# Patient Record
(demographics unavailable — no encounter records)

---

## 2025-02-19 NOTE — ADDENDUM
[FreeTextEntry1] : I, Nestor Chiquita, acted solely as a scribe for Dr. Tigre Madsen M.D. on this date 02/19/2025.   All medical record entries made by the Scribe were at my, Dr. Tigre Madsen M.D., direction and personally dictated by me on 02/19/2025. I have reviewed the chart and agree that the record accurately reflects my personal performance of the history, physical exam, assessment and plan. I have also personally directed, reviewed, and agreed with the chart.

## 2025-02-19 NOTE — ASSESSMENT
[FreeTextEntry1] : Asthma exacerbation.  Probably triggered by virus.  Interfering with CPAP usage. Pt should start taking Prednisone 10mg for 4 days for his increased respiratory symptoms. Pt should also begin Advair Maintenace inhaler 2 puffs twice a day for his asthma. Follow up appointment in 1 month

## 2025-02-19 NOTE — HISTORY OF PRESENT ILLNESS
[TextBox_4] : 51 year old male presents for follow up office visit regarding asthma, cough and SANJUANITA. Past 1 week Pt notes increased cough and dyspnea. Pt denies acid reflux or heartburn symptoms.  Reports no current sleep symptoms. Reports no symptoms of daytime sleepiness. Getting supplies regularly. Pt notes he is not currently on treatment with maintence inhalers at this time for his asthma.   Notes because of the cough he is unable to sustain his CPAP use no abdominal pain, no bloating, no constipation, no diarrhea, no nausea and no vomiting.

## 2025-02-19 NOTE — REASON FOR VISIT
[Follow-Up] : a follow-up visit [Sleep Apnea] : sleep apnea [Cough] : cough [Acute] : an acute visit

## 2025-02-19 NOTE — PROCEDURE
[FreeTextEntry1] : Chest Xray performed during office visit today -No acute findings Spirometry performed during office visit today-Demonstrates interval decline in lung function with positive bronchodilator response --------------------------------------------------------------- Usage days 22/30 days (73%) >= 4 hours 10 days (33%) < 4 hours 12 days (40%) Usage hours 82 hours 33 minutes Average usage (total days) 2 hours 45 minutes Average usage (days used) 3 hours 45 minutes Median usage (days used) 3 hours 48 minutes Total used hours (value since last reset - 02/18/2025) 867 hours AirSense 10 AutoSet Serial number [id number] Mode AutoSet Min Pressure 11 cmH2O Max Pressure 15 cmH2O EPR Fulltime EPR level 3 Response Standard Therapy Pressure - cmH2O Median: 11.8 95th percentile: 13.4 Maximum: 13.9 Leaks - L/min Median: 8.7 95th percentile: 27.2 Maximum: 35.2 Events per hour AI: 5.9 HI: 0.4 AHI: 6.3 Apnea Index Central: 1.6 Obstructive: 3.8 Unknown: 0.5 RERA Index 0.1

## 2025-02-19 NOTE — PROCEDURE
[FreeTextEntry1] : Chest Xray performed during office visit today -No acute findings Spirometry performed during office visit today-Demonstrates interval decline in lung function with positive bronchodilator response --------------------------------------------------------------- Usage days 22/30 days (73%) >= 4 hours 10 days (33%) < 4 hours 12 days (40%) Usage hours 82 hours 33 minutes Average usage (total days) 2 hours 45 minutes Average usage (days used) 3 hours 45 minutes Median usage (days used) 3 hours 48 minutes Total used hours (value since last reset - 02/18/2025) 867 hours AirSense 10 AutoSet Serial number [id number] Mode AutoSet Min Pressure 11 cmH2O Max Pressure 15 cmH2O EPR Fulltime EPR level 3 Response Standard Therapy Pressure - cmH2O Median: 11.8 95th percentile: 13.4 Maximum: 13.9 Leaks - L/min Median: 8.7 95th percentile: 27.2 Maximum: 35.2 Events per hour AI: 5.9 HI: 0.4 AHI: 6.3 Apnea Index Central: 1.6 Obstructive: 3.8 Unknown: 0.5 RERA Index 0.1

## 2025-02-19 NOTE — HISTORY OF PRESENT ILLNESS
[TextBox_4] : 51 year old male presents for follow up office visit regarding asthma, cough and SANJUANITA. Past 1 week Pt notes increased cough and dyspnea. Pt denies acid reflux or heartburn symptoms.  Reports no current sleep symptoms. Reports no symptoms of daytime sleepiness. Getting supplies regularly. Pt notes he is not currently on treatment with maintence inhalers at this time for his asthma.   Notes because of the cough he is unable to sustain his CPAP use

## 2025-03-03 NOTE — REASON FOR VISIT
[Home] : at home, [unfilled] , at the time of the visit. [Medical Office: ([facility])___] : at the medical office located in  [Technical] : patient unable to effectively utilize tele-video due to technical issues.

## 2025-03-03 NOTE — REASON FOR VISIT
[Home] : at home, [unfilled] , at the time of the visit. [Medical Office: ([facility])___] : at the medical office located in  [Technical] : patient unable to effectively utilize tele-video due to technical issues.

## 2025-03-04 NOTE — PROCEDURE
[FreeTextEntry1] : Spirometry demonstrates interval improvement but not to baseline Chest x-ray clear

## 2025-03-04 NOTE — ASSESSMENT
[FreeTextEntry1] : Spirometry shows interval improvement and Chest xray shows no sign of pneumonia.  Will give additional course of steroids From sleep apnea perspective patient would like to try fullface mask. [place] under the nose full face mask was given in replacement for nasal mask, per request of pt.  f/u in 1 month.

## 2025-03-04 NOTE — ASSESSMENT
[FreeTextEntry1] : Spirometry shows interval improvement and Chest xray shows no sign of pneumonia.  Will give additional course of steroids From sleep apnea perspective patient would like to try fullface mask. [place] under the nose full face mask was given in replacement for nasal mask, per request of pt.  f/u in 1 month.

## 2025-03-04 NOTE — PROCEDURE
Home Oxygen Evaluation completed per MD order.  Goal Sats per MD order are 88%  Pre- 98% at rest on RA, HR was 66      value Pulse ox Pulse rate distance   1 88% RA 82 80 feet   2 87% RA, 1 L NC Applied 84 160 feet   3 92% 1 L NC 78 180 feet   4      5      6           Total distance covered:180 feet     Comment: patient unable to walk further due to back issues.  MD notified. At rest post test patient was 97% on RA, HR was 70.       [FreeTextEntry1] : Spirometry demonstrates interval improvement but not to baseline Chest x-ray clear

## 2025-03-04 NOTE — HISTORY OF PRESENT ILLNESS
[TextBox_4] : Telehealth visit done yesterday for sleep apnea follow-up.  He has been unable to use his CPAP because of persistent cough and congestion after illness a few weeks ago.  He still reports cough and congestion as well as wheezing.

## 2025-04-24 NOTE — ASSESSMENT
[FreeTextEntry1] : He appears to have 2 separate unrelated issues.  He is not compliant adequately with CPAP.  He states it is not because of the breathing issue.  He simply needs to be more compliant.  The second issue is the asthma and wheezing symptoms is spirometry is normal and his lungs are clear.  I suggested for now that he take a dose of albuterol before bedtime and see if this relieves the problem otherwise I will suggest a workup for reflux

## 2025-04-24 NOTE — ADDENDUM
[FreeTextEntry1] : Recorded by Maya Branch acting as a scribe for Dr. Tigre Madsen M.D, on 04/24/2025  All medical record entries made by the Scribe were at my, Dr. Tigre Madsen M.D., direction and personally dictated by me on 04/24/2025. I have reviewed the chart and agree that the record accurately reflects my personal performance of the history, physical exam, assessment and plan. I have also personally directed, reviewed, and agreed with the chart.

## 2025-04-24 NOTE — PROCEDURE
[FreeTextEntry1] : Spirometry performed in office today shows No interval change  Usage days 23/30 days (77%) >= 4 hours 16 days (53%) < 4 hours 7 days (23%) Usage hours 104 hours 41 minutes Average usage (total days) 3 hours 29 minutes Average usage (days used) 4 hours 33 minutes Median usage (days used) 4 hours 46 minutes Total used hours (value since last reset - 04/23/2025) 1,100 hours AirSense 10 AutoSet Serial number [id number] Mode AutoSet Min Pressure 11 cmH2O Max Pressure 15 cmH2O EPR Fulltime EPR level 3 Response Standard Therapy Pressure - cmH2O Median: 11.9 95th percentile: 13.4 Maximum: 13.9 Leaks - L/min Median: 7.8 95th percentile: 26.7 Maximum: 35.6 Events per hour AI: 3.0 HI: 0.3 AHI: 3.3 Apnea Index Central: 0.8 Obstructive: 2.1 Unknown: 0.1 RERA Index 0.0 Cheyne-Bourne respiration (average duration per night) 0 minutes (0%

## 2025-04-24 NOTE — HISTORY OF PRESENT ILLNESS
[TextBox_4] : 50 yo male with history of SANJUANITA presents for a maintenance appointment Complains of dyspnea ever since returning from [place] Wheezing worsens while laying down on his back Pt was not compliant with Inhaler medication in [place]-He says that he is not symptomatic from asthma perspective when he is in [place] Currently taking Advair He reports CPAP compliance

## 2025-04-24 NOTE — PROCEDURE
[FreeTextEntry1] : Spirometry performed in office today shows No interval change  Usage days 23/30 days (77%) >= 4 hours 16 days (53%) < 4 hours 7 days (23%) Usage hours 104 hours 41 minutes Average usage (total days) 3 hours 29 minutes Average usage (days used) 4 hours 33 minutes Median usage (days used) 4 hours 46 minutes Total used hours (value since last reset - 04/23/2025) 1,100 hours AirSense 10 AutoSet Serial number [id number] Mode AutoSet Min Pressure 11 cmH2O Max Pressure 15 cmH2O EPR Fulltime EPR level 3 Response Standard Therapy Pressure - cmH2O Median: 11.9 95th percentile: 13.4 Maximum: 13.9 Leaks - L/min Median: 7.8 95th percentile: 26.7 Maximum: 35.6 Events per hour AI: 3.0 HI: 0.3 AHI: 3.3 Apnea Index Central: 0.8 Obstructive: 2.1 Unknown: 0.1 RERA Index 0.0 Cheyne-Bourne respiration (average duration per night) 0 minutes (0%

## 2025-07-03 NOTE — ASSESSMENT
[FreeTextEntry1] : Patient interested in pursuing inspire as option to treat sleep apnea.  I explained to him he would need a polysomnogram before I would refer him to make sure he does not have central apnea.  This will be scheduled

## 2025-07-03 NOTE — HISTORY OF PRESENT ILLNESS
[TextBox_4] : F/u SANJUANITA and cough   States cough has resolved since last visit - continues to use Advair, albuterol rarely   Uses CPAP - states that he would like to have an alternative due to current situation with having a  and would like to consider the Inspire device